# Patient Record
Sex: MALE | Race: BLACK OR AFRICAN AMERICAN | URBAN - METROPOLITAN AREA
[De-identification: names, ages, dates, MRNs, and addresses within clinical notes are randomized per-mention and may not be internally consistent; named-entity substitution may affect disease eponyms.]

---

## 2019-07-14 ENCOUNTER — HOSPITAL ENCOUNTER (OUTPATIENT)
Dept: ADMINISTRATIVE | Facility: HOSPITAL | Age: 59
End: 2019-07-16
Attending: INTERNAL MEDICINE | Admitting: INTERNAL MEDICINE

## 2019-07-14 LAB
ABS NEUT (OLG): 4.72 X10(3)/MCL (ref 2.1–9.2)
ALBUMIN SERPL-MCNC: 3.5 GM/DL (ref 3.4–5)
ALBUMIN/GLOB SERPL: 0.9 {RATIO}
ALP SERPL-CCNC: 68 UNIT/L (ref 50–136)
ALT SERPL-CCNC: 38 UNIT/L (ref 12–78)
APTT PPP: 28.8 SECOND(S) (ref 24.2–33.9)
AST SERPL-CCNC: 30 UNIT/L (ref 15–37)
BASOPHILS # BLD AUTO: 0.1 X10(3)/MCL (ref 0–0.2)
BASOPHILS NFR BLD AUTO: 1 %
BILIRUB SERPL-MCNC: 0.4 MG/DL (ref 0.2–1)
BILIRUBIN DIRECT+TOT PNL SERPL-MCNC: 0.1 MG/DL (ref 0–0.2)
BILIRUBIN DIRECT+TOT PNL SERPL-MCNC: 0.3 MG/DL (ref 0–0.8)
BNP BLD-MCNC: 17 PG/ML (ref 0–100)
BUN SERPL-MCNC: 38 MG/DL (ref 7–18)
CALCIUM SERPL-MCNC: 9 MG/DL (ref 8.5–10.1)
CHLORIDE SERPL-SCNC: 96 MMOL/L (ref 98–107)
CO2 SERPL-SCNC: 28 MMOL/L (ref 21–32)
CREAT SERPL-MCNC: 1.81 MG/DL (ref 0.7–1.3)
EOSINOPHIL # BLD AUTO: 0.3 X10(3)/MCL (ref 0–0.9)
EOSINOPHIL NFR BLD AUTO: 4 %
ERYTHROCYTE [DISTWIDTH] IN BLOOD BY AUTOMATED COUNT: 13.1 % (ref 11.5–17)
GLOBULIN SER-MCNC: 3.9 GM/DL (ref 2.4–3.5)
GLUCOSE SERPL-MCNC: 109 MG/DL (ref 74–106)
HCT VFR BLD AUTO: 47.3 % (ref 42–52)
HGB BLD-MCNC: 15.9 GM/DL (ref 14–18)
INR PPP: 0.9 (ref 0–1.3)
LYMPHOCYTES # BLD AUTO: 3.5 X10(3)/MCL (ref 0.6–4.6)
LYMPHOCYTES NFR BLD AUTO: 36 %
MCH RBC QN AUTO: 30.9 PG (ref 27–31)
MCHC RBC AUTO-ENTMCNC: 33.6 GM/DL (ref 33–36)
MCV RBC AUTO: 91.8 FL (ref 80–94)
MONOCYTES # BLD AUTO: 1 X10(3)/MCL (ref 0.1–1.3)
MONOCYTES NFR BLD AUTO: 11 %
NEUTROPHILS # BLD AUTO: 4.72 X10(3)/MCL (ref 2.1–9.2)
NEUTROPHILS NFR BLD AUTO: 49 %
PLATELET # BLD AUTO: 234 X10(3)/MCL (ref 130–400)
PMV BLD AUTO: 10.3 FL (ref 9.4–12.4)
POC TROPONIN: 0.01 NG/ML (ref 0–0.08)
POTASSIUM SERPL-SCNC: 3.6 MMOL/L (ref 3.5–5.1)
PROT SERPL-MCNC: 7.4 GM/DL (ref 6.4–8.2)
PROTHROMBIN TIME: 12.4 SECOND(S) (ref 12–14)
RBC # BLD AUTO: 5.15 X10(6)/MCL (ref 4.7–6.1)
SODIUM SERPL-SCNC: 130 MMOL/L (ref 136–145)
TROPONIN I SERPL-MCNC: <0.02 NG/ML (ref 0.02–0.49)
WBC # SPEC AUTO: 9.7 X10(3)/MCL (ref 4.5–11.5)

## 2019-07-15 LAB
TROPONIN I SERPL-MCNC: 0.02 NG/ML (ref 0.02–0.49)

## 2019-07-16 LAB
ALBUMIN SERPL-MCNC: 2.8 GM/DL (ref 3.4–5)
ALBUMIN/GLOB SERPL: 0.9 {RATIO}
ALP SERPL-CCNC: 57 UNIT/L (ref 50–136)
ALT SERPL-CCNC: 28 UNIT/L (ref 12–78)
AST SERPL-CCNC: 25 UNIT/L (ref 15–37)
BILIRUB SERPL-MCNC: 0.5 MG/DL (ref 0.2–1)
BILIRUBIN DIRECT+TOT PNL SERPL-MCNC: 0.1 MG/DL (ref 0–0.2)
BILIRUBIN DIRECT+TOT PNL SERPL-MCNC: 0.4 MG/DL (ref 0–0.8)
BUN SERPL-MCNC: 21 MG/DL (ref 7–18)
CALCIUM SERPL-MCNC: 8.2 MG/DL (ref 8.5–10.1)
CHLORIDE SERPL-SCNC: 101 MMOL/L (ref 98–107)
CO2 SERPL-SCNC: 27 MMOL/L (ref 21–32)
CREAT SERPL-MCNC: 1.08 MG/DL (ref 0.7–1.3)
GLOBULIN SER-MCNC: 3 GM/DL (ref 2.4–3.5)
GLUCOSE SERPL-MCNC: 125 MG/DL (ref 74–106)
POTASSIUM SERPL-SCNC: 4 MMOL/L (ref 3.5–5.1)
PROT SERPL-MCNC: 5.8 GM/DL (ref 6.4–8.2)
SODIUM SERPL-SCNC: 136 MMOL/L (ref 136–145)

## 2022-04-28 NOTE — ED PROVIDER NOTES
"   Patient:   Jayce Henriquez             MRN: [id number]            FIN: [id number]               Age:   59 years     Sex:  Male     :  1960   Associated Diagnoses:   Acute chest pain   Author:   Michael Maddox MD      Basic Information   Time seen: Date & time 2019 23:15:00.   History source: Patient.   Arrival mode: Ambulance.   History limitation: None.      History of Present Illness   The patient presents with     60 y/o AAM with a history of CHF and a MI 6-7 years ago presents to the ED via EMS due to left sided chest pain, onset of two days. Pt states the pain is not constant, sitting still and deep inhalation exacerbate the pain, and moving around relieves it. He says the pain lasts in 5-10 minute episodes and feel like a "cramp." Pt admits to coughing and SOB with the pain. He denies any fever. and Patient states he has a history of congestive heart failure..  The onset was 2  days ago.  The course/duration of symptoms is fluctuating in intensity.  Location: Left chest. Radiating pain: none. The character of symptoms is "cramp".  The degree at onset was moderate.  The degree at maximum was moderate.  The degree at present is minimal.  The exacerbating factor is Sitting still.  The relieving factor is Movement.  Risk factors consist of CHF and past MI.  Prior episodes: none.  Therapy today EMS.  Associated symptoms: shortness of breath and denies fever.        Review of Systems   Constitutional symptoms:  No fever, no chills, no sweats, no weakness, no fatigue, no decreased activity.    Skin symptoms:  No jaundice, no rash, no pruritus, no abrasions, no breakdown, no burns, no dryness, no petechiae, no lesion.    Eye symptoms:  Vision unchangedNo pain, no blurred vision.    ENMT symptoms:  No ear pain, no sore throat, no nasal congestion, no sinus pain.    Respiratory symptoms:  Shortness of breath, coughNo orthopnea, no hemoptysis, no stridor, no wheezing.    Cardiovascular symptoms:  " "Chest pain, left chest, intermittent, "cramp"No palpitations, no tachycardia, no syncope, no diaphoresis, no peripheral edema.    Gastrointestinal symptoms:  No abdominal pain, no nausea, no vomiting, no diarrhea, no constipation, no rectal bleeding, no rectal pain.    Genitourinary symptoms:  No dysuria, no hematuria.    Musculoskeletal symptoms:  No back pain, no Muscle pain, no Joint pain   Neurologic symptoms:  No headache, no dizziness, no altered level of consciousness, no numbness, no tingling, no weakness.              Additional review of systems information: All systems reviewed as documented in chart.      Health Status   Allergies: No known allergies.   Medications:  (Selected)   Documented Medications  Documented  Coreg 25 mg oral tablet: 25 mg = 1 tab(s), Oral, BID, # 60 tab(s), 0 Refill(s)  Pravastatin 40 mg Oral Tab: 40 mg = 1 tab(s), Oral, At Bedtime, # 30 tab(s), 0 Refill(s)  amlodipine 5 mg oral tablet: 5 mg = 1 tab(s), Oral, Daily, # 30 tab(s), 0 Refill(s)  furosemide 20 mg oral tablet: 20 mg = 1 tab(s), Oral, Daily, # 30 tab(s), 0 Refill(s)  hydrochlorothiazide-lisinopril 25 mg-20 mg oral tablet: 1 tab, Oral, BID, # 60 tab(s), 0 Refill(s)  spironolactone 25 mg oral tablet: 25 mg = 1 tab(s), Oral, Daily, # 30 tab(s), 0 Refill(s).      Past Medical/ Family/ Social History   Medical history:   CHF  MI.   Surgical history:    No active procedure history items have been selected or recorded..   Family history:    No family history items have been selected or recorded..   Social history: Tobacco use: Regularly, Drug use: Denies.      Physical Examination               Vital Signs   Vital Signs   7/14/2019 23:15 CDT      Peripheral Pulse Rate     56 bpm  LOW                             Heart Rate Monitored      56 bpm  LOW                             Respiratory Rate          19 br/min                             SpO2                      100 %                             Oxygen Therapy            " Nasal cannula                             Oxygen Flow Rate          2 L/min                             Systolic Blood Pressure   142 mmHg  HI                             Diastolic Blood Pressure  85 mmHg                             Mean Arterial Pressure, Cuff              104 mmHg    7/14/2019 22:59 CDT      Temperature Oral          36.6 DegC                             Temperature Oral (calculated)             97.88 DegF                             Peripheral Pulse Rate     58 bpm  LOW                             Respiratory Rate          18 br/min                             SpO2                      95 %                             Oxygen Therapy            Nasal cannula                             Oxygen Flow Rate          4 L/min                             Systolic Blood Pressure   146 mmHg  HI                             Diastolic Blood Pressure  73 mmHg  .   (Date Range: 7/14/2019 0:00 CDT - 7/15/2019 0:00 CDT).   Basic Oxygen Information   7/14/2019 23:15 CDT      SpO2                      100 %                             Oxygen Flow Rate          2 L/min                             Oxygen Therapy            Nasal cannula    7/14/2019 22:59 CDT      SpO2                      95 %                             Oxygen Flow Rate          4 L/min                             Oxygen Therapy            Nasal cannula  .   General:  Alert, no acute distress   Skin:  Warm, dry, no rash   Head:  Normocephalic, atraumatic   Neck:  Supple, trachea midline, no tenderness   Eye:  Extraocular movements are intact, vision unchanged   Ears, nose, mouth and throat:  Oral mucosa moist, no pharyngeal erythema or exudate.    Respiratory:  Lungs are clear to auscultation, respirations are non-labored, breath sounds are equal, Symmetrical chest wall expansion.    Cardiovascular:  Regular rate and rhythm, No murmur, Normal peripheral perfusion, No edema   Gastrointestinal:  Soft, Nontender, Non distended, Normal bowel sounds, No  organomegaly, Guarding: Negative, Rebound: Negative.    Musculoskeletal:  Normal ROM, normal strength, no tenderness, no swelling, no deformity.    Neurological:  Alert and oriented to person, place, time, and situation, No focal neurological deficit observed, CN II-XII intact, normal sensory observed, normal motor observed, normal speech observed, normal coordination observed.    Psychiatric:  Cooperative, appropriate mood & affect      Medical Decision Making   Documents reviewed:  Emergency department nurses' notes.   Orders  Launch Order Profile (Selected)   Inpatient Orders  Ordered  Cardiac Monitorin19 23:38:00 CDT, Constant Order  Fall Risk Protocol: 19 23:25:13 CDT, Constant Order  O2 Therapy: 19 23:03:11 CDT  Ordered (Dispatched)  CBC w/ Auto Diff: Now collect, 19 23:38:00 CDT, Blood, Stop date 19 23:39:00 CDT, Lab Collect, 19 23:38:00 CDT  CMP: Stat collect, 19 23:38:00 CDT, Blood, Stop date 19 23:39:00 CDT, Lab Collect, 19 23:38:00 CDT  PT (Protime): Stat collect, 19 23:38:00 CDT, Blood, Stop date 19 23:39:00 CDT, Lab Collect, Print Label By Order Location, 19 23:38:00 CDT  PTT: Stat collect, 19 23:39:00 CDT, Blood, Stop date 19 23:39:00 CDT, Lab Collect, 19 23:39:00 CDT  Troponin-I: Stat collect, 19 23:39:00 CDT, Blood, Stop date 19 23:39:00 CDT, Lab Collect, 19 23:39:00 CDT  Ordered (Exam Completed)  CXR 1 View: Stat, 19 23:38:00 CDT, Shortness of Breath, None, Stretcher, Rad Type, Not Scheduled, 19 23:38:00 CDT  Completed  POC BNP iSTAT: Blood, Stat collect, Collected, 19 23:19:03 CDT  POC Istat ER Troponin: Blood, Stat collect, Collected, 19 23:19:47 CDT  aspirin: 324 mg, 4 tab(s), Tab-Chew, N/A, Once, Stop date 19 23:38:45 CDT, Physician Stop, 19 23:38:45 CDT  aspirin: 324 mg, form: Tab-Chew, Oral, AdHoc, first dose 19 23:39:00 CDT, stop date  07/14/19 23:39:00 CDT  nitroglycerin: 1 in, Ointment, N/A, Once, Stop date 07/14/19 23:38:17 CDT, Physician Stop, 07/14/19 23:38:17 CDT  nitroglycerin: 1 in, form: Ointment, TOP, AdHoc, first dose 07/14/19 23:39:00 CDT, stop date 07/14/19 23:39:00 CDT.   Electrocardiogram:  Time 7/15/2019 22:58:00, rate 58, no ectopy, normal CA & QRS intervals, EP Interp, The Rhythm is sinus bradycardia.  , The Axis is leftward.  , T wave Inversion, V4, , V5, , V6, No old EKG for comparison.    Results review:  Lab results : Lab View   7/14/2019 23:19 CDT      POC BNP iSTAT             17 pg/mL                             POC Troponin              0.01 ng/mL    7/14/2019 23:15 CDT      Sodium Lvl                130 mmol/L  LOW                             Potassium Lvl             3.6 mmol/L                             Chloride                  96 mmol/L  LOW                             CO2                       28.0 mmol/L                             Calcium Lvl               9.0 mg/dL                             Glucose Lvl               109 mg/dL  HI                             BUN                       38.0 mg/dL  HI                             Creatinine                1.81 mg/dL  HI                             eGFR-AA                   50 mL/min/1.73 m2  NA                             eGFR-EDUARDO                  41 mL/min/1.73 m2  NA                             Bili Total                0.4 mg/dL                             Bili Direct               0.10 mg/dL                             Bili Indirect             0.30 mg/dL                             AST                       30 unit/L                             ALT                       38 unit/L                             Alk Phos                  68 unit/L                             Total Protein             7.4 gm/dL                             Albumin Lvl               3.50 gm/dL                             Globulin                  3.90 gm/dL  HI                              A/G Ratio                 0.9  NA                             Troponin-I                <0.02 ng/mL                             PT                        12.4 second(s)                             INR                       0.9                             PTT                       28.8 second(s)                             WBC                       9.7 x10(3)/mcL                             RBC                       5.15 x10(6)/mcL                             Hgb                       15.9 gm/dL                             Hct                       47.3 %                             Platelet                  234 x10(3)/mcL                             MCV                       91.8 fL                             MCH                       30.9 pg                             MCHC                      33.6 gm/dL                             RDW                       13.1 %                             MPV                       10.3 fL                             Abs Neut                  4.72 x10(3)/mcL                             Neutro Auto               49 %  NA                             Lymph Auto                36 %  NA                             Mono Auto                 11 %  NA                             Eos Auto                  4 %  NA                             Abs Eos                   0.3 x10(3)/mcL                             Basophil Auto             1 %  NA                             Abs Neutro                4.72 x10(3)/mcL                             Abs Lymph                 3.5 x10(3)/mcL                             Abs Mono                  1.0 x10(3)/mcL                             Abs Baso                  0.1 x10(3)/mcL  .   Radiology results:  Emergency physician interpretation: No acute changes seen.      Reexamination/ Reevaluation   Time: 7/15/2019 00:23:00 .   Vital signs   results included from flowsheet : Vital Signs   7/15/2019 0:00 CDT       Peripheral Pulse Rate     55 bpm  LOW                              Heart Rate Monitored      55 bpm  LOW                             Respiratory Rate          20 br/min                             SpO2                      100 %                             Oxygen Therapy            Nasal cannula                             Oxygen Flow Rate          1 L/min                             Systolic Blood Pressure   142 mmHg  HI                             Diastolic Blood Pressure  68 mmHg                             Mean Arterial Pressure, Cuff              93 mmHg    7/14/2019 23:15 CDT      Peripheral Pulse Rate     56 bpm  LOW                             Heart Rate Monitored      56 bpm  LOW                             Respiratory Rate          19 br/min                             SpO2                      100 %                             Oxygen Therapy            Nasal cannula                             Oxygen Flow Rate          2 L/min                             Systolic Blood Pressure   142 mmHg  HI                             Diastolic Blood Pressure  85 mmHg                             Mean Arterial Pressure, Cuff              104 mmHg    7/14/2019 22:59 CDT      Temperature Oral          36.6 DegC                             Temperature Oral (calculated)             97.88 DegF                             Peripheral Pulse Rate     58 bpm  LOW                             Respiratory Rate          18 br/min                             SpO2                      95 %                             Oxygen Therapy            Nasal cannula                             Oxygen Flow Rate          4 L/min                             Systolic Blood Pressure   146 mmHg  HI                             Diastolic Blood Pressure  73 mmHg     Course: well controlled, Patient denies any chest pain currently, He is in no apparent distress.      Impression and Plan   Diagnosis   Acute chest pain ([id number] R07.9)      Calls-Consults   -  7/15/2019 01:02:00 , Ely MENJIVAR,  okay to admit.    Plan   Condition: Stable.    Disposition: Admit time  7/15/2019 01:02:00, Place in Observation Telemetry Unit.    Counseled: Patient, Regarding diagnosis, Regarding diagnostic results, Regarding treatment plan, Patient indicated understanding of instructions.    Notes: I, Nae Patel, acted solely as a scribe for and in the presence of Dr. Maddox who performed this service., I, Grey Meier, acted solely as a scribe for and in the presence of Dr. Maddox who performed the service. , I acknowledged that the documentation on this chart was provided by a scribe on the date of service noted above and that the documentation in the chart accurately reflects work and decisions made by me alone..